# Patient Record
Sex: MALE | Race: WHITE
[De-identification: names, ages, dates, MRNs, and addresses within clinical notes are randomized per-mention and may not be internally consistent; named-entity substitution may affect disease eponyms.]

---

## 2020-09-02 ENCOUNTER — HOSPITAL ENCOUNTER (EMERGENCY)
Dept: HOSPITAL 62 - ER | Age: 50
LOS: 1 days | Discharge: OTHER INSTITUTION | End: 2020-09-03
Payer: SELF-PAY

## 2020-09-02 DIAGNOSIS — F17.200: ICD-10-CM

## 2020-09-02 DIAGNOSIS — R51: ICD-10-CM

## 2020-09-02 DIAGNOSIS — R41.0: ICD-10-CM

## 2020-09-02 DIAGNOSIS — I10: ICD-10-CM

## 2020-09-02 DIAGNOSIS — Z79.899: ICD-10-CM

## 2020-09-02 DIAGNOSIS — R56.9: ICD-10-CM

## 2020-09-02 DIAGNOSIS — F10.230: Primary | ICD-10-CM

## 2020-09-02 LAB
ADD MANUAL DIFF: NO
ADD MANUAL MICROSCOPIC: YES
ALBUMIN SERPL-MCNC: 4.4 G/DL (ref 3.5–5)
ALP SERPL-CCNC: 119 U/L (ref 38–126)
ANION GAP SERPL CALC-SCNC: 8 MMOL/L (ref 5–19)
APAP SERPL-MCNC: < 10 UG/ML (ref 10–30)
APPEARANCE UR: (no result)
APTT PPP: YELLOW S
AST SERPL-CCNC: 47 U/L (ref 17–59)
BACTERIA #/AREA URNS HPF: (no result) /HPF
BARBITURATES UR QL SCN: NEGATIVE
BASOPHILS # BLD AUTO: 0 10^3/UL (ref 0–0.2)
BASOPHILS NFR BLD AUTO: 0.6 % (ref 0–2)
BILIRUB DIRECT SERPL-MCNC: 0.3 MG/DL (ref 0–0.4)
BILIRUB SERPL-MCNC: 0.6 MG/DL (ref 0.2–1.3)
BILIRUB UR QL STRIP: NEGATIVE
BUN SERPL-MCNC: 8 MG/DL (ref 7–20)
CALCIUM: 9.5 MG/DL (ref 8.4–10.2)
CHLORIDE SERPL-SCNC: 102 MMOL/L (ref 98–107)
CO2 SERPL-SCNC: 28 MMOL/L (ref 22–30)
EOSINOPHIL # BLD AUTO: 0 10^3/UL (ref 0–0.6)
EOSINOPHIL NFR BLD AUTO: 0.5 % (ref 0–6)
ERYTHROCYTE [DISTWIDTH] IN BLOOD BY AUTOMATED COUNT: 13.6 % (ref 11.5–14)
ETHANOL SERPL-MCNC: < 10 MG/DL
GLUCOSE SERPL-MCNC: 109 MG/DL (ref 75–110)
GLUCOSE UR STRIP-MCNC: NEGATIVE MG/DL
HCT VFR BLD CALC: 42.6 % (ref 37.9–51)
HGB BLD-MCNC: 14.8 G/DL (ref 13.5–17)
KETONES UR STRIP-MCNC: NEGATIVE MG/DL
LYMPHOCYTES # BLD AUTO: 0.9 10^3/UL (ref 0.5–4.7)
LYMPHOCYTES NFR BLD AUTO: 14.1 % (ref 13–45)
MCH RBC QN AUTO: 34.6 PG (ref 27–33.4)
MCHC RBC AUTO-ENTMCNC: 34.7 G/DL (ref 32–36)
MCV RBC AUTO: 100 FL (ref 80–97)
METHADONE UR QL SCN: NEGATIVE
MONOCYTES # BLD AUTO: 0.4 10^3/UL (ref 0.1–1.4)
MONOCYTES NFR BLD AUTO: 6.5 % (ref 3–13)
NEUTROPHILS # BLD AUTO: 4.9 10^3/UL (ref 1.7–8.2)
NEUTS SEG NFR BLD AUTO: 78.3 % (ref 42–78)
NITRITE UR QL STRIP: NEGATIVE
PCP UR QL SCN: NEGATIVE
PH UR STRIP: 6 [PH] (ref 5–9)
PLATELET # BLD: 138 10^3/UL (ref 150–450)
POTASSIUM SERPL-SCNC: 4.1 MMOL/L (ref 3.6–5)
PROT SERPL-MCNC: 7.1 G/DL (ref 6.3–8.2)
PROT UR STRIP-MCNC: 100 MG/DL
RBC # BLD AUTO: 4.27 10^6/UL (ref 4.35–5.55)
RBC #/AREA URNS HPF: (no result) /HPF
SALICYLATES SERPL-MCNC: < 1 MG/DL (ref 2–20)
SP GR UR STRIP: 1.01
TOTAL CELLS COUNTED % (AUTO): 100 %
URINE AMPHETAMINES SCREEN: NEGATIVE
URINE BENZODIAZEPINES SCREEN: (no result)
URINE COCAINE SCREEN: NEGATIVE
URINE MARIJUANA (THC) SCREEN: NEGATIVE
UROBILINOGEN UR-MCNC: NEGATIVE MG/DL (ref ?–2)
WBC # BLD AUTO: 6.2 10^3/UL (ref 4–10.5)
WBC #/AREA URNS HPF: (no result) /HPF

## 2020-09-02 PROCEDURE — 80307 DRUG TEST PRSMV CHEM ANLYZR: CPT

## 2020-09-02 PROCEDURE — 93005 ELECTROCARDIOGRAM TRACING: CPT

## 2020-09-02 PROCEDURE — 99291 CRITICAL CARE FIRST HOUR: CPT

## 2020-09-02 PROCEDURE — 93010 ELECTROCARDIOGRAM REPORT: CPT

## 2020-09-02 PROCEDURE — 96365 THER/PROPH/DIAG IV INF INIT: CPT

## 2020-09-02 PROCEDURE — 96367 TX/PROPH/DG ADDL SEQ IV INF: CPT

## 2020-09-02 PROCEDURE — 36415 COLL VENOUS BLD VENIPUNCTURE: CPT

## 2020-09-02 PROCEDURE — 80053 COMPREHEN METABOLIC PANEL: CPT

## 2020-09-02 PROCEDURE — 96372 THER/PROPH/DIAG INJ SC/IM: CPT

## 2020-09-02 PROCEDURE — 81001 URINALYSIS AUTO W/SCOPE: CPT

## 2020-09-02 PROCEDURE — 70450 CT HEAD/BRAIN W/O DYE: CPT

## 2020-09-02 PROCEDURE — 85025 COMPLETE CBC W/AUTO DIFF WBC: CPT

## 2020-09-02 NOTE — ER DOCUMENT REPORT
ED Medical Screen (RME)





- General


Chief Complaint: Probable Seizure


Stated Complaint: FALL


Time Seen by Provider: 09/02/20 21:36


Information source: Patient


Notes: 





Patient presents after having a witnessed seizure 2 hours prior to arrival at 

the [facility].  Patient was admitted there for alcohol detox.  Patient 

states last alcohol use was 6 days ago.  Patient states that he feels much 

better since receiving an injection at the detox facility after he had a 

seizure.  Patient does have noticeable fine tremor to the hands bilaterally.





I have greeted and performed a rapid initial assessment of this patient.  A 

comprehensive ED assessment and evaluation of the patient, analysis of test 

results and completion of the medical decision making process will be conducted 

by additional ED providers.





Physical Exam





- Vital signs


Vitals: 





                                        











Temp Pulse Resp BP Pulse Ox


 


 98.1 F   99   18   130/78 H  95 


 


 09/02/20 20:52  09/02/20 20:52  09/02/20 20:52  09/02/20 20:52  09/02/20 20:52














- General


General appearance: Alert


Notes: 





Tremor noted to bilateral upper extremities





Course





- Vital Signs


Vital signs: 





                                        











Temp Pulse Resp BP Pulse Ox


 


 98.1 F   99   18   130/78 H  95 


 


 09/02/20 20:52  09/02/20 20:52  09/02/20 20:52  09/02/20 20:52  09/02/20 20:52

## 2020-09-03 VITALS — DIASTOLIC BLOOD PRESSURE: 89 MMHG | SYSTOLIC BLOOD PRESSURE: 114 MMHG

## 2020-09-03 NOTE — RADIOLOGY REPORT (SQ)
EXAM DESCRIPTION: 



CT HEAD WITHOUT IV CONTRAST



COMPLETED DATE/TME:  09/03/2020 05:12



CLINICAL HISTORY: 



50 years, Male, seizure



COMPARISON:

None.



TECHNIQUE:

207  Images stored on PACS.

 

All CT scanners at this facility use dose modulation, iterative

reconstruction, and/or weight based dosing when appropriate to

reduce radiation dose to as low as reasonably achievable (ALARA).





[facility]: Dose Right [facility]: CareDose   [facility]: Dose Right    [facility]:

Teradose 4D    [facility]: Smart Technologies



LIMITATIONS:

None.



FINDINGS:



The globes are intact. Paranasal sinuses and mastoid air cells

are well aerated. No displaced or depressed skull fracture. No

intra or extra-axial hemorrhage. CT is limited for evaluation of

acute infarct. No CT evidence for large or territorial acute

infarct. No mass. No midline shift





IMPRESSION:



No acute intracranial abnormality

 

TECHNICAL DOCUMENTATION:



Quality ID # 436: Final reports with documentation of one or more

dose reduction techniques (e.g., Automated exposure control,

adjustment of the mA and/or kV according to patient size, use of

iterative reconstruction technique)



copyright 2011 [employer] All Rights Reserved

## 2020-09-03 NOTE — ER DOCUMENT REPORT
ED General





- General


Chief Complaint: Seizure


Stated Complaint: FALL


Time Seen by Provider: 09/02/20 21:36





- HPI


Notes: 





Patient is a 50-year-old male who presents to the ER for evaluation of seizures.

 Entire history is obtained from the referral sheet from [facility].  Evidently the 

patient was admitted for alcohol detoxification on September 1.  On September 2 

at 7:48 PM, patient was sitting when he started shaking, exhibiting seizure 

activity.  He was given 2 mg of IM lorazepam and sent to the ED for further 

evaluation.  He had been given gabapentin 600 mg p.o. at 3:45 PM.





- Related Data


Allergies/Adverse Reactions: 


                                        





No Known Allergies Allergy (Unverified 09/03/20 04:53)


   








Home Medications: Antihypertensives, alprazolam





Past Medical History





- General


Information source: Patient





- Social History


Smoking Status: Current Every Day Smoker


Frequency of alcohol use: Heavy


Drug Abuse: None


Family History: Reviewed & Not Pertinent





- Past Medical History


Cardiac Medical History: Reports: Hx Hypertension


Past Surgical History: Reports: Other - Carpal tunnel release





Review of Systems





- Review of Systems


-: Yes ROS unobtainable due to patient's medical condition





Physical Exam





- Vital signs


Vitals: 


                                        











Temp Pulse Resp BP Pulse Ox


 


 98.1 F   99   18   130/78 H  95 


 


 09/02/20 20:52  09/02/20 20:52  09/02/20 20:52  09/02/20 20:52  09/02/20 20:52














- Notes


Notes: 





Vital signs reviewed, please refer to chart. Head is normocephalic, atraumatic. 

Pupils equal round, reactive to light.  Neck is supple without meningismus.  

Heart is regular rate and rhythm.  Lungs are clear to auscultation bilaterally. 

Abdomen is soft, nontender, normoactive bowel sounds throughout.  Extremities 

without cyanosis, clubbing. Posterior calves are nontender.  Peripheral pulses 

are equal.  Skin is warm and dry.  Patient is awake, alert, disoriented to place

and time.  Cranial nerves II - XII are grossly intact without focal neurological

deficits.  Strength is plus 5 out of 5 bilateral upper and lower extremities.  

Sensation is intact.  Reflexes symmetrical.  Intact finger-nose-finger, rapid 

alternating movements, heel-to-shin.





Course





- Re-evaluation


Re-evalutation: 





09/03/20 05:11


Patient presented to the emergency department for evaluation.  He was initially 

seen through triage, was given oral Ativan, had laboratory investigations 

ordered.  I was called into evaluate the patient when he had been being 

transported back to a room, had a seizure activity.  He did have witnessed 

seizure activity, was lowered to the floor.  Seizure lasted approximately 60 

seconds.  He did have an appropriate postictal.  He became slightly combative.  

He was medicated with 2 mg of IM Ativan, placed in a bed with seizure 

precautions in place.  Was placed on cardiac monitor.  We will continue to 

monitor.


09/03/20 06:02


I went back into evaluate patient after he returned from CT scan.  He is still 

disoriented, but now admits that he drinks.  He admits he is here for alcohol 

withdrawal.  He states that he drinks beer, is not uncommon for him to drink 

uppercase a day.  He has no history of seizures prior to this.  He complains of 

a mild headache to the left side of his head, otherwise no acute complaints or 

concerns.  Still awaiting results of scan, lab work is largely unremarkable.


09/03/20 06:22


CT scan unremarkable.  [facility] states that after a CT scan and medical clearance he 

can go back to [facility].  I will write him a prescription for a week of Keppra.  He 

is to return to the ED with worsening or new concerning symptoms of any sort.





- Vital Signs


Vital signs: 


                                        











Temp Pulse Resp BP Pulse Ox


 


 98.4 F   98   27 H  139/95 H  97 


 


 09/03/20 01:44  09/03/20 01:44  09/03/20 04:25  09/03/20 04:28  09/03/20 04:28














- Laboratory


Result Diagrams: 


                                 09/02/20 21:56





                                 09/02/20 21:56


Laboratory results interpreted by me: 


                                        











  09/02/20 09/02/20 09/02/20





  21:56 21:56 21:56


 


RBC  4.27 L  


 


MCV  100 H  


 


MCH  34.6 H  


 


Plt Count  138 L  


 


Seg Neutrophils %  78.3 H  


 


Urine Protein    100 H


 


Urine Ascorbic Acid    20 H


 


Salicylates   < 1.0 L 


 


Acetaminophen   < 10 L 














- Diagnostic Test


Radiology reviewed: Image reviewed, Reports reviewed


Radiology results interpreted by me: 





09/03/20 06:23





                                        





Head CT  09/03/20 05:12


IMPRESSION:


 


No acute intracranial abnormality


 


TECHNICAL DOCUMENTATION:


 


Quality ID # 436: Final reports with documentation of one or more


dose reduction techniques (e.g., Automated exposure control,


adjustment of the mA and/or kV according to patient size, use of


iterative reconstruction technique)


 


copyright 2011 [employer]- All Rights Reserved


 














- EKG Interpretation by Me


Additional EKG results interpreted by me: 





09/03/20 05:13


Sinus mechanism with rate of 72 bpm.  Normal axis and intervals.  Some baseline 

interference on leads V2 and V3.  No acute ST changes concerning for ischemia or

infarction.  No old studies available for comparison.





Critical Care Note





- Critical Care Note


Total time excluding time spent on procedures (mins): 30





Discharge





- Discharge


Clinical Impression: 


Alcohol withdrawal seizure


Qualifiers:


 Complication of substance-induced condition: uncomplicated Qualified Code(s): 

F10.230 - Alcohol dependence with withdrawal, uncomplicated





Condition: Stable


Disposition: OTHER


Instructions:  Alcohol Withdrawl ([facility]), Chronic Alcoholism ([facility])


Additional Instructions: 


You have had a seizure, likely secondary to alcohol withdrawal.  Please take the

medication as prescribed.  You are to go directly to [facility] for further treatment 

and evaluation.  Return to the emergency department with worsening or new 

concerning symptoms of any sort.


Prescriptions: 


Levetiracetam [Keppra 500 mg Tablet] 500 mg PO Q12 #14 tablet

## 2020-09-03 NOTE — EKG REPORT
SEVERITY:- ABNORMAL ECG -

SINUS RHYTHM

CONSIDER ANTEROSEPTAL INFARCT

:

Confirmed by: Elías Guevara MD 03-Sep-2020 07:50:29